# Patient Record
Sex: FEMALE | Race: WHITE | NOT HISPANIC OR LATINO
[De-identification: names, ages, dates, MRNs, and addresses within clinical notes are randomized per-mention and may not be internally consistent; named-entity substitution may affect disease eponyms.]

---

## 2018-02-02 ENCOUNTER — RECORDS - HEALTHEAST (OUTPATIENT)
Dept: ADMINISTRATIVE | Facility: OTHER | Age: 33
End: 2018-02-02

## 2018-02-06 ENCOUNTER — RECORDS - HEALTHEAST (OUTPATIENT)
Dept: ADMINISTRATIVE | Facility: OTHER | Age: 33
End: 2018-02-06

## 2018-02-07 ENCOUNTER — RECORDS - HEALTHEAST (OUTPATIENT)
Dept: ADMINISTRATIVE | Facility: OTHER | Age: 33
End: 2018-02-07

## 2018-02-15 ENCOUNTER — RECORDS - HEALTHEAST (OUTPATIENT)
Dept: ADMINISTRATIVE | Facility: OTHER | Age: 33
End: 2018-02-15

## 2018-02-23 ENCOUNTER — RECORDS - HEALTHEAST (OUTPATIENT)
Dept: ADMINISTRATIVE | Facility: OTHER | Age: 33
End: 2018-02-23

## 2018-02-28 ENCOUNTER — RECORDS - HEALTHEAST (OUTPATIENT)
Dept: ADMINISTRATIVE | Facility: OTHER | Age: 33
End: 2018-02-28

## 2018-03-06 ENCOUNTER — RECORDS - HEALTHEAST (OUTPATIENT)
Dept: ADMINISTRATIVE | Facility: OTHER | Age: 33
End: 2018-03-06

## 2018-03-09 ENCOUNTER — RECORDS - HEALTHEAST (OUTPATIENT)
Dept: ADMINISTRATIVE | Facility: OTHER | Age: 33
End: 2018-03-09

## 2018-03-13 ENCOUNTER — AMBULATORY - HEALTHEAST (OUTPATIENT)
Dept: VASCULAR SURGERY | Facility: CLINIC | Age: 33
End: 2018-03-13

## 2018-03-13 DIAGNOSIS — L76.82 INCISIONAL PAIN: ICD-10-CM

## 2018-03-19 ENCOUNTER — RECORDS - HEALTHEAST (OUTPATIENT)
Dept: ADMINISTRATIVE | Facility: OTHER | Age: 33
End: 2018-03-19

## 2018-03-19 ENCOUNTER — OFFICE VISIT - HEALTHEAST (OUTPATIENT)
Dept: VASCULAR SURGERY | Facility: CLINIC | Age: 33
End: 2018-03-19

## 2018-03-19 DIAGNOSIS — Z98.890 S/P HARDWARE REMOVAL: ICD-10-CM

## 2018-03-19 DIAGNOSIS — Z98.1 S/P LUMBAR FUSION: ICD-10-CM

## 2018-03-19 DIAGNOSIS — L76.82 INCISIONAL PAIN: ICD-10-CM

## 2018-03-19 ASSESSMENT — MIFFLIN-ST. JEOR: SCORE: 1283.75

## 2021-05-26 ENCOUNTER — RECORDS - HEALTHEAST (OUTPATIENT)
Dept: ADMINISTRATIVE | Facility: CLINIC | Age: 36
End: 2021-05-26

## 2021-05-27 ENCOUNTER — RECORDS - HEALTHEAST (OUTPATIENT)
Dept: ADMINISTRATIVE | Facility: CLINIC | Age: 36
End: 2021-05-27

## 2021-05-28 ENCOUNTER — RECORDS - HEALTHEAST (OUTPATIENT)
Dept: ADMINISTRATIVE | Facility: CLINIC | Age: 36
End: 2021-05-28

## 2021-05-29 ENCOUNTER — RECORDS - HEALTHEAST (OUTPATIENT)
Dept: ADMINISTRATIVE | Facility: CLINIC | Age: 36
End: 2021-05-29

## 2021-05-30 ENCOUNTER — RECORDS - HEALTHEAST (OUTPATIENT)
Dept: ADMINISTRATIVE | Facility: CLINIC | Age: 36
End: 2021-05-30

## 2021-05-31 ENCOUNTER — RECORDS - HEALTHEAST (OUTPATIENT)
Dept: ADMINISTRATIVE | Facility: CLINIC | Age: 36
End: 2021-05-31

## 2021-06-01 VITALS — BODY MASS INDEX: 22.53 KG/M2 | WEIGHT: 132 LBS | HEIGHT: 64 IN

## 2021-06-02 ENCOUNTER — RECORDS - HEALTHEAST (OUTPATIENT)
Dept: ADMINISTRATIVE | Facility: CLINIC | Age: 36
End: 2021-06-02

## 2021-06-03 ENCOUNTER — RECORDS - HEALTHEAST (OUTPATIENT)
Dept: ADMINISTRATIVE | Facility: CLINIC | Age: 36
End: 2021-06-03

## 2021-06-26 NOTE — PROGRESS NOTES
"Progress Notes by Christina Corrales NP at 3/19/2018  3:00 PM     Author: Christina Corrales NP Service: -- Author Type: Nurse Practitioner    Filed: 3/19/2018  4:14 PM Encounter Date: 3/19/2018 Status: Signed    : Christina Corrales NP (Nurse Practitioner)       [facility] Consult Note    Date of Service:  3/19/2018    Requesting Provider: self-referral    Chief Complaint: complication with her incision sites    History of Present Illness: Gricel Phillips is being seen at the Vascular Clinic today regarding her back surgical incisions. They arrive to the clinic today alone. The patient reports that in 2/2018 pt went in to have hardware removed and left dorsal column stimulator removed with Dr. Cheung  neurosurgery. She has been having issues with their care team and now has a malpractice suit in place. One of her main issues is with wording in the surgical progress note; which she has brought with her today; which states paramedian incision and her incision is midline spine; she also brings a photo of her back post operatively where there is a purple surgical pen \"x\" on her back and this is not where the incision was made. She has not received an explaination as to why the surgeon did not make the incision where they had talked about. 4 days post operatively she was trying to get a hold of the care team to report issues with the incision, inability to urinate, and pain control issues. She had a posterior anterior discectomy L5-S1 done in 2007; she states this was done for \"my son basically broke my back while I was pregnant\". She reports that her electrolytes have been off since surgery; potassium is low; is currently taking 40 meq for this and they are finally WNL. She does historically have electrolyte imbalances due to Topamax; she is being weaned off this medication. She reports that the incisions are swelling. She is having low grade fevers, white count is fluctuating; she feels that " "something is wrong with her; she is worried that there is an infection. She attempted to get appt with [facility] and they were scheduling several weeks out; she obtained an appt with [facility]. She was treated with Keflex, then ciprofloxacin and then another course of Keflex; she does not feel that this helped.  Was previously using nothing to the wounds; leaving neil; washing with hibiclens in the shower. Reports pain of 7-8/10; constant; \"feels like fire\" radiating in the hips and joints and groin; currently using apap or Vicodin for pain. She still is having issues with urinating; has good days and bad days. No culture has been done. Has had several skin cancers removed in the past. She continues to see dermatology; having issues with the old low abdominal incision.    Review of Systems:   Constitutional: low grade fevers  EENTM: negative for glasses;  negative [place]; +light sensitivity  GI:  negative for nausea/vomiting;  positive for constipation  positive diarrhea; vacillates between constipation and diarrhea negative for fecal incontinence + weight loss; started at 150s; now 130s  :  negative dysuria, incontinence  MS: patient is ambulatory;  does not use assistive devices; neck stiffness  Cardiac:  positive ablations done in the past; afib; PVCs  Respiratory:  negative SOB  Endocrine:  negative diabetes; +Graves disease  Psych:  positive depression/anxiety    Past Medical History:    Past Medical History:   Diagnosis Date   ? Acute conjunctivitis of right eye 12/16/2015   ? Allergic to bees 8/30/2007   ? Blepharitis of both eyes 10/27/2015   ? Chalazion of left eye 10/13/2015   ? Chronic low back pain 3/28/2016   ? Chronic pain 10/6/2011    Overview:  Chronic back pain   ? DDD (degenerative disc disease), lumbosacral 5/8/2008    Overview:  Pt had partial hardware removal from her back (L5-S1)   ? Depression with anxiety 10/6/2011   ? Depressive disorder 12/15/2011    Overview:  Depressive disorder, not " elsewhere classified (HR)   ? Fibromyalgia 10/6/2011    Overview:  Fibromyalgia - by report   ? Migraine without aura 9/16/2013   ? Neck strain 3/28/2016   ? Pain from implanted hardware 1/14/2016   ? Pain in joint involving forearm 1/19/2015   ? Panic disorder without agoraphobia 11/8/2007   ? S/P lumbar fusion 4/2/2015   ? Screening for malignant neoplasm of cervix 3/26/2015    Overview:  [id number] NILM Plan: cotest 3/2018 Epic    ? Tobacco use disorder 6/13/2006   ? Vitamin D deficiency 10/29/2012       Surgical History:   Past Surgical History:   Procedure Laterality Date   ? HARDWARE REMOVAL  12/2011   ? LUMBAR FUSION  07/11/2007   ? TONSILECTOMY, ADENOIDECTOMY, BILATERAL MYRINGOTOMY AND TUBES  2016         Medications:    Current Outpatient Prescriptions:   ?  armodafinil (NUVIGIL) 50 mg tablet, Take 100 mg by mouth daily., Disp: , Rfl:   ?  buPROPion (WELLBUTRIN) 75 MG tablet, Take 75 mg by mouth 2 (two) times a day., Disp: , Rfl:   ?  cholecalciferol, vitamin D3, 1,000 unit tablet, Take 1,000 Units by mouth daily., Disp: , Rfl:   ?  ciprofloxacin-dexamethasone (CIPRODEX) otic suspension, 4 drops 2 (two) times a day., Disp: , Rfl:   ?  fluticasone (FLONASE) 50 mcg/actuation nasal spray, 1 spray into each nostril daily., Disp: , Rfl:   ?  pregabalin (LYRICA) 25 MG capsule, Take 25 mg by mouth 2 (two) times a day., Disp: , Rfl:   ?  topiramate (TOPAMAX) 50 MG tablet, Take 100 mg by mouth 2 (two) times a day., Disp: , Rfl:   ?  vortioxetine (TRINTELLIX) 20 mg Tab tablet, Take 20 mg by mouth daily., Disp: , Rfl:     Allergies:   Allergies   Allergen Reactions   ? Augmentin [Amoxicillin-Pot Clavulanate]    ? Barium Sulfate    ? Venom-Honey Bee Swelling       Family history:   Family History   Problem Relation Age of Onset   ? Hypothyroidism Mother    ? Hypertension Father          Social History:   Social History     Social History   ? Marital status: Single     Spouse name: N/A   ? Number of  "children: N/A   ? Years of education: N/A     Occupational History   ? Not on file.     Social History Main Topics   ? Smoking status: Current Every Day Smoker     Packs/day: 0.50     Years: 15.00   ? Smokeless tobacco: Never Used   ? Alcohol use Not on file   ? Drug use: Not on file   ? Sexual activity: Not on file     Other Topics Concern   ? Not on file     Social History Narrative   ? No narrative on file        Physical Exam  Vitals: Blood pressure 118/64, pulse 68, resp. rate 16, height 5' 4\" (1.626 m), weight 132 lb (59.9 kg).   General: This is a 32 y.o. female who appears their reported age, not in acute distress; shaking  Head: normocephalic, Atraumatic; not wearing glasses; non-icteric sclera; no exudate; no hearing loss   Respiratory: Clear throughout all lung fields; unlabored breathing; no cough   Cardiac: Regular, Rate and Rhythm, no murmurs appreciated   Skin: Uniformly warm and dry  Psych: Alert and oriented x4; calm and cooperative throughout exam very talkative  Back: midline back with x2 incisions; and x1 left transverse; all are well approximated; no exudate; skin intact; there is minimal surrounding edema; no erythema; minimally tender with palpation  Circumferential volume measures:    No flowsheet data found.    Ulceration(s)/Wound(s):     Wound 03/19/18 Lumbar (Active)   Description scab/scar 3/19/2018  3:00 PM       Laboratory studies:   No results found for: SEDRATE  Lab Results   Component Value Date    CREATININE 0.86 03/10/2017     No results found for: HGBA1C  Lab Results   Component Value Date    BUN 9 03/10/2017     Lab Results   Component Value Date    ALBUMIN 4.1 03/10/2017     No results found for: [id number]              Impression:   Complications with incision    Assessment/Plan:  1. Excisional debridement of all the ulcer(s) was not recommended today as the skin was intact    2. Edema. The swelling near and around the incisions appears to be wnl post operative swelling    3. " Treatment no dressings needed; no acute s/sx of infection noted; no further antibiotics indicated. The patient has no open wounds; does not appear to be an appropriate consultation today for wound care clinician. Recommend continuing to work with her PMD.     4. Offloading: na    5. Nutrition: na    Patient to return to clinic PRN for re-evaluation. They were instructed to call the clinic sooner with any further questions or concerns. Answered all questions.    Christina Corrales DNP, RN, CNP, [facility]  [facility]  [phone]

## 2024-07-25 ENCOUNTER — HOSPITAL ENCOUNTER (EMERGENCY)
Facility: HOSPITAL | Age: 39
End: 2024-07-25

## 2024-07-25 ENCOUNTER — HOSPITAL ENCOUNTER (OUTPATIENT)
Facility: HOSPITAL | Age: 39
Discharge: HOME | End: 2024-07-25
Attending: FAMILY MEDICINE | Admitting: OBSTETRICS & GYNECOLOGY
Payer: MEDICAID

## 2024-07-25 ENCOUNTER — HOSPITAL ENCOUNTER (OUTPATIENT)
Facility: HOSPITAL | Age: 39
End: 2024-07-25
Admitting: OBSTETRICS & GYNECOLOGY
Payer: MEDICAID

## 2024-07-25 ENCOUNTER — HOSPITAL ENCOUNTER (EMERGENCY)
Facility: HOSPITAL | Age: 39
Discharge: HOME | End: 2024-07-25
Attending: STUDENT IN AN ORGANIZED HEALTH CARE EDUCATION/TRAINING PROGRAM | Admitting: STUDENT IN AN ORGANIZED HEALTH CARE EDUCATION/TRAINING PROGRAM
Payer: MEDICAID

## 2024-07-25 VITALS
DIASTOLIC BLOOD PRESSURE: 72 MMHG | HEART RATE: 93 BPM | OXYGEN SATURATION: 97 % | SYSTOLIC BLOOD PRESSURE: 114 MMHG | BODY MASS INDEX: 28.15 KG/M2 | RESPIRATION RATE: 16 BRPM | TEMPERATURE: 98.4 F | WEIGHT: 164 LBS

## 2024-07-25 VITALS — DIASTOLIC BLOOD PRESSURE: 70 MMHG | SYSTOLIC BLOOD PRESSURE: 128 MMHG | TEMPERATURE: 98.1 F | RESPIRATION RATE: 18 BRPM

## 2024-07-25 DIAGNOSIS — R51.9 ACUTE NONINTRACTABLE HEADACHE, UNSPECIFIED HEADACHE TYPE: ICD-10-CM

## 2024-07-25 PROBLEM — Z36.89 ENCOUNTER FOR TRIAGE IN PREGNANT PATIENT: Status: ACTIVE | Noted: 2024-07-25

## 2024-07-25 PROCEDURE — 96375 TX/PRO/DX INJ NEW DRUG ADDON: CPT

## 2024-07-25 PROCEDURE — 250N000011 HC RX IP 250 OP 636: Performed by: STUDENT IN AN ORGANIZED HEALTH CARE EDUCATION/TRAINING PROGRAM

## 2024-07-25 PROCEDURE — 96361 HYDRATE IV INFUSION ADD-ON: CPT

## 2024-07-25 PROCEDURE — 99284 EMERGENCY DEPT VISIT MOD MDM: CPT | Mod: 25

## 2024-07-25 PROCEDURE — 258N000003 HC RX IP 258 OP 636: Performed by: STUDENT IN AN ORGANIZED HEALTH CARE EDUCATION/TRAINING PROGRAM

## 2024-07-25 PROCEDURE — 96374 THER/PROPH/DIAG INJ IV PUSH: CPT

## 2024-07-25 RX ORDER — DEXAMETHASONE SODIUM PHOSPHATE 4 MG/ML
4 INJECTION, SOLUTION INTRA-ARTICULAR; INTRALESIONAL; INTRAMUSCULAR; INTRAVENOUS; SOFT TISSUE ONCE
Status: COMPLETED | OUTPATIENT
Start: 2024-07-25 | End: 2024-07-25

## 2024-07-25 RX ORDER — LIDOCAINE 40 MG/G
CREAM TOPICAL
Status: DISCONTINUED | OUTPATIENT
Start: 2024-07-25 | End: 2024-07-25 | Stop reason: HOSPADM

## 2024-07-25 RX ORDER — DIPHENHYDRAMINE HYDROCHLORIDE 50 MG/ML
50 INJECTION INTRAMUSCULAR; INTRAVENOUS ONCE
Status: COMPLETED | OUTPATIENT
Start: 2024-07-25 | End: 2024-07-25

## 2024-07-25 RX ORDER — METOCLOPRAMIDE HYDROCHLORIDE 5 MG/ML
10 INJECTION INTRAMUSCULAR; INTRAVENOUS ONCE
Status: COMPLETED | OUTPATIENT
Start: 2024-07-25 | End: 2024-07-25

## 2024-07-25 RX ORDER — METOCLOPRAMIDE 5 MG/1
5 TABLET ORAL 3 TIMES DAILY PRN
Qty: 15 TABLET | Refills: 0 | Status: SHIPPED | OUTPATIENT
Start: 2024-07-25 | End: 2024-07-30

## 2024-07-25 RX ADMIN — DEXAMETHASONE SODIUM PHOSPHATE 4 MG: 4 INJECTION, SOLUTION INTRA-ARTICULAR; INTRALESIONAL; INTRAMUSCULAR; INTRAVENOUS; SOFT TISSUE at 03:45

## 2024-07-25 RX ADMIN — SODIUM CHLORIDE 1000 ML: 9 INJECTION, SOLUTION INTRAVENOUS at 03:42

## 2024-07-25 RX ADMIN — DIPHENHYDRAMINE HYDROCHLORIDE 50 MG: 50 INJECTION INTRAMUSCULAR; INTRAVENOUS at 03:47

## 2024-07-25 RX ADMIN — METOCLOPRAMIDE 10 MG: 5 INJECTION, SOLUTION INTRAMUSCULAR; INTRAVENOUS at 03:50

## 2024-07-25 ASSESSMENT — COLUMBIA-SUICIDE SEVERITY RATING SCALE - C-SSRS
2. HAVE YOU ACTUALLY HAD ANY THOUGHTS OF KILLING YOURSELF IN THE PAST MONTH?: NO
6. HAVE YOU EVER DONE ANYTHING, STARTED TO DO ANYTHING, OR PREPARED TO DO ANYTHING TO END YOUR LIFE?: NO
1. IN THE PAST MONTH, HAVE YOU WISHED YOU WERE DEAD OR WISHED YOU COULD GO TO SLEEP AND NOT WAKE UP?: NO

## 2024-07-25 ASSESSMENT — ACTIVITIES OF DAILY LIVING (ADL)
ADLS_ACUITY_SCORE: 35
ADLS_ACUITY_SCORE: 35
ADLS_ACUITY_SCORE: 18

## 2024-07-25 NOTE — Clinical Note
Gricel Phillips was seen and treated in our emergency department on 7/25/2024.  She may return to work on 07/27/2024.       If you have any questions or concerns, please don't hesitate to call.      William Castillo MD

## 2024-07-25 NOTE — DISCHARGE INSTRUCTIONS
Please return to the emergency department if your symptoms worsen, if you develop fever, numbness/weakness on one side of your body, facial swelling.    Follow-up with your dentist regarding the pain.

## 2024-07-25 NOTE — ED NOTES
Received patient on sign out.    HPI    Patient presents with headache. Patient had two recent dental procedures, most recent being yesterday. She developed a headache yesterday, and has been having pain around her teeth, left sided.         Vitals:    07/25/24 0310   BP: 120/72   Pulse: 94   Resp: 16   Temp: 98.4  F (36.9  C)   SpO2: 97%   Weight: 74.4 kg (164 lb)              ED COURSE AND MEDICAL DECISION MAKING      Received patient on signout.  Disposition pending reevaluation after migraine cocktail.    - Patient feeling much better.  Neurologically grossly intact.  Plan for discharge home.        FINAL DIAGNOSIS    Headache         Ohl, Goldy Allan,   07/25/24 2455

## 2024-07-25 NOTE — DISCHARGE INSTRUCTIONS
Learning About When to Call Your Doctor During Pregnancy (After 20 Weeks)  Overview  It's common to have concerns about what might be a problem when you're pregnant. Most pregnancies don't have any serious problems. But it's still important to know when to call your doctor if you have certain symptoms or signs of labor.  These are general suggestions. Your doctor may give you some more information about when to call.  When to call your doctor (after 20 weeks)  Call 911  anytime you think you may need emergency care. For example, call if:  You have severe vaginal bleeding. This means you are soaking through a pad each hour for 2 or more hours.  You have sudden, severe pain in your belly.  You have chest pain, are short of breath, or cough up blood.  You passed out (lost consciousness).  You have a seizure.  You see or feel the umbilical cord.  You think you are about to deliver your baby and can't make it safely to the hospital or birthing center.  Call your doctor now or seek immediate medical care if:  You have vaginal bleeding.  You have belly pain.  You have a fever.  You are dizzy or lightheaded, or you feel like you may faint.  You have signs of a blood clot in your leg (called a deep vein thrombosis), such as:  Pain in the calf, back of the knee, thigh, or groin.  Swelling in your leg or groin.  A color change on the leg or groin. The skin may be reddish or purplish, depending on your usual skin color.  You have symptoms of preeclampsia, such as:  Sudden swelling of your face, hands, or feet.  New vision problems (such as dimness, blurring, or seeing spots).  A severe headache.  You have a sudden release of fluid from your vagina. (You think your water broke.)  You've been having regular contractions for an hour. This means that you've had at least 6 contractions within 1 hour, even after you change your position and drink fluids.  You notice that your baby has stopped moving or is moving less than  "normal.  You have signs of heart failure, such as:  New or increased shortness of breath.  New or worse swelling in your legs, ankles, or feet.  Sudden weight gain, such as more than 2 to 3 pounds in a day or 5 pounds in a week.  Feeling so tired or weak that you cannot do your usual activities.  You have symptoms of a urinary tract infection. These may include:  Pain or burning when you urinate.  A frequent need to urinate without being able to pass much urine.  Pain in the flank, which is just below the rib cage and above the waist on either side of the back.  Blood in your urine.  Watch closely for changes in your health, and be sure to contact your doctor if:  You have vaginal discharge that smells bad.  You feel sad, anxious, or hopeless for more than a few days.  You have skin changes, such as a rash, itching, or a yellow color to your skin.  You have other concerns about your pregnancy.  If you have labor signs at 37 weeks or more  If you have signs of labor at 37 weeks or more, your doctor may tell you to call when your labor becomes more active. Symptoms of active labor include:  Contractions that are regular.  Contractions that are less than 5 minutes apart.  Contractions that are hard to talk through.  Follow-up care is a key part of your treatment and safety. Be sure to make and go to all appointments, and call your doctor if you are having problems. It's also a good idea to know your test results and keep a list of the medicines you take.  Where can you learn more?  Go to [email or web address]  Enter N531 in the search box to learn more about \"Learning About When to Call Your Doctor During Pregnancy (After 20 Weeks).\"  Current as of: July 10, 2023               Content Version: 14.0    2219-9921 [employer].   Care instructions adapted under license by your healthcare professional. If you have questions about a medical condition or this instruction, always ask your healthcare " professional. [employer] disclaims any warranty or liability for your use of this information.

## 2024-07-25 NOTE — ED PROVIDER NOTES
EMERGENCY DEPARTMENT ENCOUNTER      NAME: Gricel Phillips  AGE: 38 year old female  YOB: 1985  MRN: [id number]  EVALUATION DATE & TIME: 7/25/2024  3:12 AM    PCP: Caden Álvarez    ED PROVIDER: William Castillo MD      Chief Complaint   Patient presents with    Headache         FINAL IMPRESSION:  1. Acute nonintractable headache, unspecified headache type          ED COURSE & MEDICAL DECISION MAKING:    Pertinent Labs & Imaging studies reviewed. (See chart for details)  38 year old female presents to the Emergency Department for evaluation of headache    ED Course as of 07/25/24 0350   Thu Jul 25, 2024   0333 Patient is a 38-year-old female who presents the emergency department with left-sided headache that began yesterday, and she is concerned is related to the recent cavity filling that she has had in her left upper molars.  Her most recent dental visit was yesterday, and her headache was ongoing before this.  No focal neurologic deficits on exam, no evidence of associated abscess or infection of the affected teeth.  No swelling of the maxilla.  No temporal tenderness.  Normal left TM.  No fever, neck stiffness.  Low suspicion for deep space infection, meningitis/encephalitis.  Suspect that the recent dental work has triggered her migraines.  Do not think imaging or lab work is indicated.  Will treat with fluids, Reglan, Benadryl, Decadron.  Patient has already been assessed by labor and delivery upstairs prior to her ED visit, and chart review shows this is reportedly a normal exam of baby.   1262 Patient signed out with the following to do:  -Reassess symptoms 1 hour after medications       Medical Decision Making  Obtained supplemental history:Supplemental history obtained?: No  Reviewed external records: External records reviewed?: No  Care impacted by chronic illness:Chronic Pain  Care significantly affected by social determinants of health:N/A  Did you consider but not order tests?: Work  up considered but not performed and documented in chart, if applicable  Did you interpret images independently?: Independent interpretation of ECG and images noted in documentation, when applicable.  Consultation discussion with other provider:Did you involve another provider (consultant, , pharmacy, etc.)?: No  Discharge. I prescribed additional prescription strength medication(s) as charted. See documentation for any additional details.        At the conclusion of the encounter I discussed the results of all of the tests and the disposition. The questions were answered. The patient or family acknowledged understanding and was agreeable with the care plan.     0 minutes of critical care time     MEDICATIONS GIVEN IN THE EMERGENCY:  Medications   metoclopramide (REGLAN) injection 10 mg (has no administration in time range)   sodium chloride 0.9% BOLUS 1,000 mL (1,000 mLs Intravenous $New Bag 24)   diphenhydrAMINE (BENADRYL) injection 50 mg (50 mg Intravenous $Given 24)   dexAMETHasone (DECADRON) injection 4 mg (4 mg Intravenous $Given 24)       NEW PRESCRIPTIONS STARTED AT TODAY'S ER VISIT  New Prescriptions    METOCLOPRAMIDE (REGLAN) 5 MG TABLET    Take 1 tablet (5 mg) by mouth 3 times daily as needed (headache, nausea)          =================================================================    HPI    Patient information was obtained from: patient    Use of : N/A       Gricel Phillips is a 38 year old female, currently pregnant, who presents to this ED via walk-in for evaluation of headache.    The patient is currently 24 weeks pregnant, .    The patient has had two recent dental procedures on  and yesterday for tooth fillings for 2 teeth, both on the upper left side. Before the appointment yesterday, the patient had a headache and a feeling of pressure in her head that is still present. Since the appointment, the patient has been noting dental pain, with a  pressure feeling on her left cheek and by her left ear. Use of Tylenol at 9:30PM and 1:30AM has not significantly helped.    The patient notes being a bit feverish yesterday. She denies the presence of any other symptoms.       PAST MEDICAL HISTORY:  History reviewed. No pertinent past medical history.    PAST SURGICAL HISTORY:  Past Surgical History:   Procedure Laterality Date    LUMBAR FUSION  07/11/2007    REMOVE HARDWARE LOWER EXTREMITY  12/2011    TONSILLECTOMY, ADENOIDECTOMY, MYRINGOTOMY, INSERT TUBE BILATERAL, COMBINED  2016           CURRENT MEDICATIONS:    metoclopramide (REGLAN) 5 MG tablet  armodafinil (NUVIGIL) 50 mg tablet  buPROPion (WELLBUTRIN) 75 MG tablet  cholecalciferol, vitamin D3, 1,000 unit tablet  ciprofloxacin-dexamethasone (CIPRODEX) otic suspension  fluticasone (FLONASE) 50 mcg/actuation nasal spray  pregabalin (LYRICA) 25 MG capsule  topiramate (TOPAMAX) 50 MG tablet  vortioxetine (TRINTELLIX) 20 mg Tab tablet        ALLERGIES:  Allergies   Allergen Reactions    Amoxicillin-Pot Clavulanate Unknown    Barium Sulfate Unknown    Betamethasone Dipropionate (Augmented) [Betamethasone] GI Disturbance    Venom-Honey Bee [Bee Venom] Swelling       FAMILY HISTORY:  Family History   Problem Relation Age of Onset    Hypothyroidism Mother     Hypertension Father        SOCIAL HISTORY:   Social History     Socioeconomic History    Marital status: Single   Tobacco Use    Smoking status: Every Day     Current packs/day: 0.50     Average packs/day: 0.5 packs/day for 15.0 years (7.5 ttl pk-yrs)     Types: Cigarettes    Smokeless tobacco: Current   Substance and Sexual Activity    Alcohol use: Not Currently    Drug use: Never     Social Determinants of Health     Financial Resource Strain: At Risk (10/30/2023)    Received from [facility]    Financial Resource Strain     Is it hard for you to pay for the very basics like food, housing, medical care or heating?: Yes   Food Insecurity: Not At Risk  (10/30/2023)    Received from [facility]    Food Insecurity     Does your food run out before you have the money to buy more?: No   Transportation Needs: Not At Risk (10/30/2023)    Received from [facility]    Transportation Needs     Does a lack of transportation keep you from your medical appointments or from getting your medications?: No       VITALS:  /72   Pulse 94   Temp 98.4  F (36.9  C)   Resp 16   Wt 74.4 kg (164 lb)   SpO2 97%   BMI 28.15 kg/m      PHYSICAL EXAM    Physical Exam  Vitals and nursing note reviewed.   Constitutional:       General: She is not in acute distress.     Appearance: Normal appearance. She is normal weight. She is not ill-appearing.   HENT:      Head: Normocephalic and atraumatic.      Left Ear: Tympanic membrane, ear canal and external ear normal.      Nose: Nose normal.      Mouth/Throat:      Mouth: Mucous membranes are moist.      Pharynx: Oropharynx is clear. No oropharyngeal exudate or posterior oropharyngeal erythema.      Comments: Left upper molar fillings appear to be in appropriate position.  No cracking through the teeth.  No associated swelling of the gums or buccal mucosa.  No evidence of abscess via direct visualization in addition to palpation.  No purulence.  Symmetric oropharynx.  Eyes:      Extraocular Movements: Extraocular movements intact.      Conjunctiva/sclera: Conjunctivae normal.      Pupils: Pupils are equal, round, and reactive to light.   Cardiovascular:      Rate and Rhythm: Normal rate.      Pulses: Normal pulses.   Pulmonary:      Effort: Pulmonary effort is normal.   Abdominal:      General: Abdomen is flat.   Musculoskeletal:         General: Normal range of motion.      Cervical back: Normal range of motion and neck supple. No rigidity or tenderness.      Right lower leg: No edema.      Left lower leg: No edema.   Skin:     General: Skin is warm and dry.      Capillary Refill: Capillary refill takes less than 2 seconds.    Neurological:      General: No focal deficit present.      Mental Status: She is alert and oriented to person, place, and time. Mental status is at baseline.   Psychiatric:         Mood and Affect: Mood normal.         Behavior: Behavior normal.         Thought Content: Thought content normal.         Judgment: Judgment normal.            LAB:  All pertinent labs reviewed and interpreted.       RADIOLOGY:  Reviewed all pertinent imaging. Please see official radiology report.  No orders to display       PROCEDURES:   None      [facility] Documentation:   CMS Diagnoses:               I, Rm Steward, am serving as a scribe to document services personally performed by William Castillo MD based on my observation and the provider's statements to me. I, William Castillo MD, attest that Rm Steward is acting in a scribe capacity, has observed my performance of the services and has documented them in accordance with my direction.    William Castillo MD  [facility] EMERGENCY DEPARTMENT  [address] [place] [id number]  [phone]       William Castillo MD  07/25/24 0350

## 2024-07-25 NOTE — PROGRESS NOTES
Data: [facility] presented to [facility]: 2024  2:28 AM.  Reason for maternal/fetal assessment is  headache . Patient reports headache starting early July after a dental procedure. It has been lingering since. She saw the dentist today and felt that it got worsre. Patient denies uterine contractions, leaking of vaginal fluid/rupture of membranes, vaginal bleeding, abdominal pain, pelvic pressure, nausea, vomiting, visual disturbances, epigastric or RUQ pain, significant edema. Patient reports fetal movement is normal. Patient is a 24w4d .  Prenatal record reviewed. Pregnancy has been uncomplicated.    Vital signs wnl. Support person is not present.     Action: Verbal consent for EFM. Triage assessment completed.     Response: Patient verbalized agreement with plan. Will contact Dr. Saldivar with update and further orders.      Dr. Saldivar gave orders for discharge down to the ED.

## 2024-07-25 NOTE — ED TRIAGE NOTES
The patient is 24 weeks pregnant. She had dental work done on 7/1 and 7/23. She reports that she now has a headache on the same side of her head as the dental work. HX of migraines but this feels different.      Triage Assessment (Adult)       Row Name 07/25/24 0310          Triage Assessment    Airway WDL WDL        Cognitive/Neuro/Behavioral WDL    Cognitive/Neuro/Behavioral WDL WDL

## 2024-07-29 NOTE — PROGRESS NOTES
Received a call from Radha Washburn with [employer] stating that she had received a referral. She is asking if patient is still here or where she went at discharge. Upon reviewing chart, it appears that patient is not here but unable to tell where patient went from notes. Radha states will not follow up on the referral at this point but hospital staff can make a referral in the future if concerns are noted.    Pati Valera RN

## 2024-09-23 ENCOUNTER — HOSPITAL ENCOUNTER (OUTPATIENT)
Facility: HOSPITAL | Age: 39
End: 2024-09-23
Admitting: STUDENT IN AN ORGANIZED HEALTH CARE EDUCATION/TRAINING PROGRAM
Payer: MEDICAID

## 2024-09-23 ENCOUNTER — HOSPITAL ENCOUNTER (OUTPATIENT)
Facility: HOSPITAL | Age: 39
Discharge: HOME | End: 2024-09-23
Attending: STUDENT IN AN ORGANIZED HEALTH CARE EDUCATION/TRAINING PROGRAM | Admitting: STUDENT IN AN ORGANIZED HEALTH CARE EDUCATION/TRAINING PROGRAM
Payer: MEDICAID

## 2024-09-23 PROCEDURE — G0463 HOSPITAL OUTPT CLINIC VISIT: HCPCS

## 2024-09-23 RX ORDER — LIDOCAINE 40 MG/G
CREAM TOPICAL
Status: DISCONTINUED | OUTPATIENT
Start: 2024-09-23 | End: 2024-09-23 | Stop reason: HOSPADM

## 2024-09-23 ASSESSMENT — ACTIVITIES OF DAILY LIVING (ADL)
ADLS_ACUITY_SCORE: 35
ADLS_ACUITY_SCORE: 35

## 2024-09-23 NOTE — DISCHARGE INSTRUCTIONS
Learning About When to Call Your Doctor During Pregnancy (After 20 Weeks)  Overview  It's common to have concerns about what might be a problem when you're pregnant. Most pregnancies don't have any serious problems. But it's still important to know when to call your doctor if you have certain symptoms or signs of labor.  These are general suggestions. Your doctor may give you some more information about when to call.  When to call your doctor (after 20 weeks)  Call 911  anytime you think you may need emergency care. For example, call if:  You have severe vaginal bleeding. This means you are soaking through a pad each hour for 2 or more hours.  You have sudden, severe pain in your belly.  You have chest pain, are short of breath, or cough up blood.  You passed out (lost consciousness).  You have a seizure.  You see or feel the umbilical cord.  You think you are about to deliver your baby and can't make it safely to the hospital or birthing center.  Call your doctor now or seek immediate medical care if:  You have vaginal bleeding.  You have belly pain.  You have a fever.  You are dizzy or lightheaded, or you feel like you may faint.  You have signs of a blood clot in your leg (called a deep vein thrombosis), such as:  Pain in the calf, back of the knee, thigh, or groin.  Swelling in your leg or groin.  A color change on the leg or groin. The skin may be reddish or purplish, depending on your usual skin color.  You have symptoms of preeclampsia, such as:  Sudden swelling of your face, hands, or feet.  New vision problems (such as dimness, blurring, or seeing spots).  A severe headache.  You have a sudden release of fluid from your vagina. (You think your water broke.)  You've been having regular contractions for an hour. This means that you've had at least 6 contractions within 1 hour, even after you change your position and drink fluids.  You notice that your baby has stopped moving or is moving less than  "normal.  You have signs of heart failure, such as:  New or increased shortness of breath.  New or worse swelling in your legs, ankles, or feet.  Sudden weight gain, such as more than 2 to 3 pounds in a day or 5 pounds in a week.  Feeling so tired or weak that you cannot do your usual activities.  You have symptoms of a urinary tract infection. These may include:  Pain or burning when you urinate.  A frequent need to urinate without being able to pass much urine.  Pain in the flank, which is just below the rib cage and above the waist on either side of the back.  Blood in your urine.  Watch closely for changes in your health, and be sure to contact your doctor if:  You have vaginal discharge that smells bad.  You feel sad, anxious, or hopeless for more than a few days.  You have skin changes, such as a rash, itching, or a yellow color to your skin.  You have other concerns about your pregnancy.  If you have labor signs at 37 weeks or more  If you have signs of labor at 37 weeks or more, your doctor may tell you to call when your labor becomes more active. Symptoms of active labor include:  Contractions that are regular.  Contractions that are less than 5 minutes apart.  Contractions that are hard to talk through.  Follow-up care is a key part of your treatment and safety. Be sure to make and go to all appointments, and call your doctor if you are having problems. It's also a good idea to know your test results and keep a list of the medicines you take.  Where can you learn more?  Go to https://www.[employer].net/patiented  Enter N531 in the search box to learn more about \"Learning About When to Call Your Doctor During Pregnancy (After 20 Weeks).\"  Current as of: July 10, 2023  Content Version: 14.1    [id number] [employer].   Care instructions adapted under license by your healthcare professional. If you have questions about a medical condition or this instruction, always ask your healthcare " professional. [employer] disclaims any warranty or liability for your use of this information.

## 2024-09-23 NOTE — PROGRESS NOTES
Data: [facility]: 2024 12:11 PM.  Reason for maternal/fetal assessment is . Patient reports decreased fetal movement, pain along both sides of abdomen and left groin since yesterday. Patient denies uterine contractions, leaking of vaginal fluid/rupture of membranes, vaginal bleeding, pelvic pressure, nausea, vomiting, headache, visual disturbances, epigastric or RUQ pain, significant edema. Patient reports fetal movement is decreased. Patient is a 33w1d . Pregnancy has been uncomplicated.    Vital signs wnl. Support person is not present.     Action: Verbal consent for EFM. Triage assessment completed.     Response: Patient verbalized agreement with plan. Will contact Izabela Fang MD (resident) with update and further orders.

## 2024-09-23 NOTE — PROGRESS NOTES
OBSTETRICS TRIAGE ASSESSMENT NOTE    Gricel Phillips is a 38 year old  at 33w1d gestation based on LMP of 2024 and 11w US who has presented to maternity care for further evaluation of abdominal and side pain.     Was visiting her dad on this side of town yesterday. Noted worsening bilateral abdominal/side and groin pain yesterday. Somewhat worse on the L side. Finds positional changes can be helpful for the pain. Has not taken any medications. Also felt like baby had decreased movement yesterday. Sol tried scooping her belly which normally initiates the baby moving more though did not seem to work as much this time.    No bleeding, leakage of fluids, contractions. No epigastric or RUQ pain, no significant extremity edema. No urinary symptoms. No change in typical vaginal discharge. Has been a little nauseated at times though has not had any vomiting and denies any current nausea.     Also currently taking amoxicillin for cellulitis of right leg after cat bite. Today is the last day of her antibiotic course.     PRENATAL CARE  Seen by Dr. Edgardo Singh at [facility] clinic.     Of note was seeing MFM due to AMA and complete placenta previa. On last US on  placenta previa had resolved and placenta now noted to be anterior.     Baby boy - plans to name Danie.     Plans for tubal after this pregnancy.     PAST MEDICAL HISTORY  Past Medical History:   Diagnosis Date    Graves disease      PAST SURGICAL HISTORY   Past Surgical History:   Procedure Laterality Date    LUMBAR FUSION  2007    REMOVE HARDWARE LOWER EXTREMITY  2011    TONSILLECTOMY, ADENOIDECTOMY, MYRINGOTOMY, INSERT TUBE BILATERAL, COMBINED  2016   Hx of heart ablations in ,  for PVCs and PACs    MEDICATIONS    Current Facility-Administered Medications:     lidocaine (LMX4) cream, , Topical, Q1H PRN, Gricel Lynch MD    lidocaine 1 % 0.1-1 mL, 0.1-1 mL, Other, Q1H PRN, Gricel Lynch  MD Jacqueline    sodium chloride (PF) 0.9% PF flush 3 mL, 3 mL, Intracatheter, Q8H, Gricel Lynch MD    sodium chloride (PF) 0.9% PF flush 3 mL, 3 mL, Intracatheter, q1 min prn, Gricel Lynch MD    Current Outpatient Medications:     armodafinil (NUVIGIL) 50 mg tablet, [ARMODAFINIL (NUVIGIL) 50 MG TABLET] Take 100 mg by mouth daily. (Patient not taking: Reported on 7/25/2024), Disp: , Rfl:     buPROPion (WELLBUTRIN) 75 MG tablet, [BUPROPION (WELLBUTRIN) 75 MG TABLET] Take 75 mg by mouth 2 (two) times a day. (Patient not taking: Reported on 7/25/2024), Disp: , Rfl:     cholecalciferol, vitamin D3, 1,000 unit tablet, [CHOLECALCIFEROL, VITAMIN D3, 1,000 UNIT TABLET] Take 1,000 Units by mouth daily. (Patient not taking: Reported on 7/25/2024), Disp: , Rfl:     ciprofloxacin-dexamethasone (CIPRODEX) otic suspension, [CIPROFLOXACIN-DEXAMETHASONE (CIPRODEX) OTIC SUSPENSION] 4 drops 2 (two) times a day. (Patient not taking: Reported on 7/25/2024), Disp: , Rfl:     fluticasone (FLONASE) 50 mcg/actuation nasal spray, [FLUTICASONE (FLONASE) 50 MCG/ACTUATION NASAL SPRAY] 1 spray into each nostril daily. (Patient not taking: Reported on 7/25/2024), Disp: , Rfl:     pregabalin (LYRICA) 25 MG capsule, [PREGABALIN (LYRICA) 25 MG CAPSULE] Take 25 mg by mouth 2 (two) times a day. (Patient not taking: Reported on 7/25/2024), Disp: , Rfl:     topiramate (TOPAMAX) 50 MG tablet, [TOPIRAMATE (TOPAMAX) 50 MG TABLET] Take 100 mg by mouth 2 (two) times a day. (Patient not taking: Reported on 7/25/2024), Disp: , Rfl:     vortioxetine (TRINTELLIX) 20 mg Tab tablet, [VORTIOXETINE (TRINTELLIX) 20 MG TAB TABLET] Take 20 mg by mouth daily. (Patient not taking: Reported on 7/25/2024), Disp: , Rfl:     Patient only taking amoxicillin for current infection and [place] multivitamin as has been unable to take prenatal vitamins.     SOCIAL HISTORY:   Social History     Socioeconomic History    Marital status: Single     Spouse  "name: Not on file    Number of children: Not on file    Years of education: Not on file    Highest education level: Not on file   Occupational History    Not on file   Tobacco Use    Smoking status: Every Day     Current packs/day: 0.50     Average packs/day: 0.5 packs/day for 15.0 years (7.5 ttl pk-yrs)     Types: Cigarettes    Smokeless tobacco: Current   Substance and Sexual Activity    Alcohol use: Not Currently    Drug use: Never    Sexual activity: Not on file   Other Topics Concern    Not on file   Social History Narrative    Not on file     Social Determinants of Health     Financial Resource Strain: At Risk (10/30/2023)    Received from [facility]    Financial Resource Strain     Is it hard for you to pay for the very basics like food, housing, medical care or heating?: Yes   Food Insecurity: Not At Risk (10/30/2023)    Received from [facility]    Food Insecurity     Does your food run out before you have the money to buy more?: No   Transportation Needs: Not At Risk (10/30/2023)    Received from [facility]    Transportation Needs     Does a lack of transportation keep you from your medical appointments or from getting your medications?: No   Physical Activity: Not on file   Stress: Not on file   Social Connections: Not on file   Interpersonal Safety: Not on file   Housing Stability: Not on file     Currently smoking up to 1/4 PPD.     PHYSICAL EXAMINATION   Vital signs:                         Estimated body mass index is 28.15 kg/m  as calculated from the following:    Height as of 3/19/18: 1.626 m (5' 4\").    Weight as of 7/25/24: 74.4 kg (164 lb).    Gen: appears comfortable lying in bed, no acute distress  CV: regular rate and rhythm  Lungs: clear to ausculation, good air movement throughout  Abdomen: Gravid, non-tender fundus  MSK: right leg with small scab at area of prior cat bite, no erythema or edema; no tenderness to palpation of bilateral flanks  Extremities: no lower extremity " edema  Cervix: not completed    FHT: Category 1 tracing; baseline 145 with moderate variability and accelerations, no decelerations  Contractions: none    LAB RESULTS  No labs for this visit.    ASSESSMENT:  Gricel Phillips is a 38 year old year old at 33w1d weeks presenting with bilateral abdominal, flank, and groin pain consistent with muscular strain vs Markel Hebert contractions. Less likely round ligament pain as patient is in the third trimester. Overall vitals, exam, and fetal strip are reassuring.     PLAN:  - Recommend follow up with Dr. Singh in 1-2 days and for continued prenatal care as planned.    - Smoking cessation advised.     Izabela Fang MD  [facility]     Precepted patient with Dr. Michael Walker who agrees with the plan above.

## 2024-09-29 ENCOUNTER — HEALTH MAINTENANCE LETTER (OUTPATIENT)
Age: 39
End: 2024-09-29